# Patient Record
Sex: MALE | NOT HISPANIC OR LATINO | URBAN - NONMETROPOLITAN AREA
[De-identification: names, ages, dates, MRNs, and addresses within clinical notes are randomized per-mention and may not be internally consistent; named-entity substitution may affect disease eponyms.]

---

## 2017-01-24 PROBLEM — H40.013: Noted: 2017-01-24

## 2017-01-24 PROBLEM — H52.223: Noted: 2017-01-24

## 2017-01-24 PROBLEM — H52.02: Noted: 2017-01-24

## 2017-01-24 PROBLEM — H52.11: Noted: 2017-01-24

## 2019-02-21 ENCOUNTER — IMPORTED ENCOUNTER (OUTPATIENT)
Dept: URBAN - NONMETROPOLITAN AREA CLINIC 1 | Facility: CLINIC | Age: 39
End: 2019-02-21

## 2019-02-21 PROCEDURE — 92015 DETERMINE REFRACTIVE STATE: CPT

## 2019-02-21 PROCEDURE — 92014 COMPRE OPH EXAM EST PT 1/>: CPT

## 2019-02-21 PROCEDURE — 92310 CONTACT LENS FITTING OU: CPT

## 2019-02-21 NOTE — PATIENT DISCUSSION
Astigmatism OU- Discussed diagnosis in detail with patient- New glasses Rx given today- Fit for new CL today f/u in 1-2 weeks for recheck (ordering new trials)- Continue to monitor yearly or PRN Borderline Glaucoma OU- Discussed diagnosis in detail with patient- Glaucoma in Grandmother- IOP today is stable- Cup to Disc noted at .6 OU- RTC 3-4 months F/U BL [facility] with OCT and PACHY; 's Notes: MR  2/21/19

## 2019-03-13 ENCOUNTER — IMPORTED ENCOUNTER (OUTPATIENT)
Dept: URBAN - NONMETROPOLITAN AREA CLINIC 1 | Facility: CLINIC | Age: 39
End: 2019-03-13

## 2019-03-13 NOTE — PATIENT DISCUSSION
Astigmatism OU- Discussed diagnosis in detail with patient- Trials given today patient to try lenses and follow up in 1 week - New glasses Rx given today- Continue to monitor - RTC 1 week FUBorderline Glaucoma OU- Discussed diagnosis in detail with patient- Glaucoma in Grandmother- IOP today is stable- Cup to Disc noted at .6 OU- RTC 3-4 months F/U BL [facility] with OCT and PACHY; 's Notes: MR  2/21/19

## 2019-03-26 ENCOUNTER — IMPORTED ENCOUNTER (OUTPATIENT)
Dept: URBAN - NONMETROPOLITAN AREA CLINIC 1 | Facility: CLINIC | Age: 39
End: 2019-03-26

## 2019-03-26 NOTE — PATIENT DISCUSSION
CL Check - Discussed diagnosis in detail with patient- New CL RX given today - Continue to monitorAstigmatism OU- Discussed diagnosis in detail with patient- New glasses Rx given today- Continue to monitor Borderline Glaucoma OU- Discussed diagnosis in detail with patient- Glaucoma in Grandmother- IOP today is stable- Cup to Disc noted at .6 OU- RTC 3-4 months F/U BL [facility] with OCT and PACHY; 's Notes: MR  2/21/19

## 2022-04-09 ASSESSMENT — VISUAL ACUITY
OD_SC: 20/20
OU_CC: 20/20
OD_SC: 20/20
OS_SC: 20/20-2 BLURRY
OS_SC: 20/20
OU_SC: 20/20
OS_SC: 20/20 (BLURRY)
OD_SC: 20/20
OU_SC: 20/20
OU_SC: 20/20

## 2022-04-09 ASSESSMENT — TONOMETRY
OS_IOP_MMHG: 14
OD_IOP_MMHG: 14